# Patient Record
Sex: FEMALE | Race: WHITE | URBAN - METROPOLITAN AREA
[De-identification: names, ages, dates, MRNs, and addresses within clinical notes are randomized per-mention and may not be internally consistent; named-entity substitution may affect disease eponyms.]

---

## 2019-12-03 ENCOUNTER — OFFICE VISIT (OUTPATIENT)
Dept: URBAN - METROPOLITAN AREA CLINIC 52 | Facility: CLINIC | Age: 84
End: 2019-12-03
Payer: MEDICARE

## 2019-12-03 DIAGNOSIS — H57.13 OCULAR PAIN, BILATERAL: Primary | ICD-10-CM

## 2019-12-03 PROCEDURE — 92004 COMPRE OPH EXAM NEW PT 1/>: CPT | Performed by: OPHTHALMOLOGY

## 2019-12-03 ASSESSMENT — INTRAOCULAR PRESSURE
OD: 18
OS: 18

## 2019-12-03 NOTE — IMPRESSION/PLAN
Impression: Ocular pain, bilateral: H57.13. Plan: No ocular reason for pain. Recommend pt to go to the ER for imagining and perhaps neuro consult. Will continue to monitor for changes.

## 2019-12-06 ENCOUNTER — OFFICE VISIT (OUTPATIENT)
Dept: URBAN - METROPOLITAN AREA CLINIC 52 | Facility: CLINIC | Age: 84
End: 2019-12-06
Payer: MEDICARE

## 2019-12-06 PROCEDURE — 99213 OFFICE O/P EST LOW 20 MIN: CPT | Performed by: OPHTHALMOLOGY

## 2019-12-06 ASSESSMENT — INTRAOCULAR PRESSURE
OD: 18
OS: 18

## 2019-12-06 NOTE — IMPRESSION/PLAN
Impression: Ocular pain, bilateral: H57.13. Plan: Still no significant ocular findings, recommend pt to continue with Ibuprofen, 2 pills  po with a meal tid, 400mg. Pt notes that she did take some Ibuprofen this am, so pain is not horrible today. Pt to continue with regimen for a few days then RTC if pt is not better. Suspect orbital myositis- type picture improving with non steroidal's. Pt to go to ER for any neuro symptoms. As she is improving, hold on orbital MRI.   Would like Dr. Viviana High to eval sl elevated CRP  ESR 7mm/hr

## 2019-12-13 ENCOUNTER — OFFICE VISIT (OUTPATIENT)
Dept: URBAN - METROPOLITAN AREA CLINIC 52 | Facility: CLINIC | Age: 84
End: 2019-12-13
Payer: MEDICARE

## 2019-12-13 PROCEDURE — 99213 OFFICE O/P EST LOW 20 MIN: CPT | Performed by: OPHTHALMOLOGY

## 2019-12-13 ASSESSMENT — INTRAOCULAR PRESSURE
OS: 18
OD: 20

## 2019-12-13 NOTE — IMPRESSION/PLAN
Impression: Ocular pain, bilateral: H57.13. Plan: Ordered LABS and Imaging today, recommend pt to get it done today, pt understands and states that she will get it done today. Pt is using prednisone 40 mg qd po from PCP, recommend pt to finish course. Use AFT and warm compresses for comfort.  Will see pt in a week